# Patient Record
Sex: MALE | Race: WHITE | NOT HISPANIC OR LATINO | URBAN - METROPOLITAN AREA
[De-identification: names, ages, dates, MRNs, and addresses within clinical notes are randomized per-mention and may not be internally consistent; named-entity substitution may affect disease eponyms.]

---

## 2017-01-12 ENCOUNTER — OFFICE VISIT - RIVER FALLS (OUTPATIENT)
Dept: FAMILY MEDICINE | Facility: CLINIC | Age: 22
End: 2017-01-12

## 2017-01-12 ASSESSMENT — MIFFLIN-ST. JEOR: SCORE: 1351.05

## 2022-02-12 VITALS
WEIGHT: 122 LBS | SYSTOLIC BLOOD PRESSURE: 116 MMHG | BODY MASS INDEX: 23.95 KG/M2 | HEIGHT: 60 IN | DIASTOLIC BLOOD PRESSURE: 74 MMHG | HEART RATE: 80 BPM | TEMPERATURE: 98 F

## 2022-02-16 NOTE — PROGRESS NOTES
Patient:   MARQUISE SALAZAR            MRN: [id number]            FIN: [id number]               Age:   21 years     Sex:  Male     :  1995   Associated Diagnoses:   Dermatomycosis; Immunization due; Sports physical   Author:   Sanjuanita Oh      Visit Information      Date of Service: 2017 04:16 pm  Performing Location: [facility]  Encounter#: [id number]      Primary Care Provider (PCP):  Sanjuanita Oh    NPI# [id number]      Well Adult History   PPC for complete physical, will be in [employer] playing basketball, track and bowling  he is here with female cousin, mother had to be contacted by phone  bilateral hearing aides  cousin has limited knowledge of past medical hx  has rash on right side of face which cousin feels has been present for 2 weeks, no ointment applied, Marquise tells me it is itchy but not painful  weight is stable      Review of Systems   Constitutional:  Negative.    Eye:  Negative.    Ear/Nose/Mouth/Throat:  Negative.    Respiratory:  Negative.    Cardiovascular:  Negative.    Breast:  Negative.    Gastrointestinal:  Negative.    Genitourinary:  Negative.    Gynecologic:  Negative.    Hematology/Lymphatics:  Negative.    Endocrine:  Negative.    Immunologic:  Negative.    Musculoskeletal:  Negative.    Integumentary:  Negative.    Neurologic:  Negative.    Psychiatric:  Negative.             Health Status   Allergies:    Allergic Reactions (Selected)  No known allergies   Problem list:    All Problems  Hearing Loss / ICD-9-.9 / Confirmed  Down syndrome / SNOMED CT [id number] / Confirmed      Histories   Family History:    No family history items have been selected or recorded.   Procedure history:    Myringotomy and insertion of T tube ([id number]).  Adenoidectomy ([id number]).   Social History:             No active social history items have been recorded.      Physical Examination   General:  Alert and oriented, No acute distress.    Eye:   Pupils are equal, round and reactive to light, Intact accommodation, Normal conjunctiva, Vision unchanged.         Periorbital area: Within normal limits.    HENT:  Normocephalic, Tympanic membranes are clear, Normal hearing, Oral mucosa is moist, No pharyngeal erythema, No sinus tenderness, bilateral hearing aides, right auditory canal has cerumen in it, less so on left.    Neck:  Supple, Non-tender, No lymphadenopathy, No thyromegaly.    Respiratory:  Lungs are clear to auscultation, Respirations are non-labored, No chest wall tenderness.    Cardiovascular:  Normal rate, Regular rhythm, No murmur, No edema.    Gastrointestinal:  Soft, Non-tender, Non-distended, Normal bowel sounds, No organomegaly.    Genitourinary:  No costovertebral angle tenderness, No scrotal tenderness, No inguinal tenderness, no hernia noted.    Lymphatics:  No lymphadenopathy neck, axilla, groin.    Musculoskeletal:  Normal range of motion, Normal strength, No swelling.    Integumentary:  Warm, Dry, Pink, right side of face anterior to ear, inferior to temporal lobe there john 2.5cmx1.5cm erythematous oval lesion.    Neurologic:  Alert, Oriented.    Psychiatric:  Cooperative, Appropriate mood & affect.       Review / Management   Radiology results   X-ray, form from [employer] wants full radiologic evaluation for atlanto-axial stability: 3 view of cervical spine ordered, I discussed this with cousin prior to ordering images      Impression and Plan   Diagnosis     Dermatomycosis (HSB97-UB B36.9).     Immunization due (AEL93-OR Z23).     Sports physical (RRT09-VZ Z02.5).     Plan:  i do n ot see anything that would withhold approval for [employer]  I have signed his form but did note that the final cervical spine read is pending  lotrimin for side of face and explained to cousin this should be applied over rash and about one inch outside of perimeter  .    Orders     Orders (Selected)   Prescriptions  Prescribed  Lotrimin 1%  topical cream: 1 tegan, TOP, BID, # 30 g, 0 Refill(s), Type: Maintenance, Pharmacy: [facility] [id number], 1 tegan top bid.